# Patient Record
Sex: MALE | Race: WHITE | Employment: UNEMPLOYED | URBAN - METROPOLITAN AREA
[De-identification: names, ages, dates, MRNs, and addresses within clinical notes are randomized per-mention and may not be internally consistent; named-entity substitution may affect disease eponyms.]

---

## 2019-01-25 ENCOUNTER — OFFICE VISIT (OUTPATIENT)
Dept: ORTHOPEDIC SURGERY | Age: 24
End: 2019-01-25
Payer: COMMERCIAL

## 2019-01-25 VITALS — HEIGHT: 65 IN | WEIGHT: 201.5 LBS | BODY MASS INDEX: 33.57 KG/M2 | RESPIRATION RATE: 17 BRPM

## 2019-01-25 DIAGNOSIS — M79.601 RIGHT ARM PAIN: ICD-10-CM

## 2019-01-25 DIAGNOSIS — G54.0: ICD-10-CM

## 2019-01-25 DIAGNOSIS — M79.641 PAIN OF RIGHT HAND: Primary | ICD-10-CM

## 2019-01-25 PROCEDURE — G8427 DOCREV CUR MEDS BY ELIG CLIN: HCPCS | Performed by: ORTHOPAEDIC SURGERY

## 2019-01-25 PROCEDURE — 99243 OFF/OP CNSLTJ NEW/EST LOW 30: CPT | Performed by: ORTHOPAEDIC SURGERY

## 2019-01-25 PROCEDURE — G8417 CALC BMI ABV UP PARAM F/U: HCPCS | Performed by: ORTHOPAEDIC SURGERY

## 2019-01-25 PROCEDURE — G8484 FLU IMMUNIZE NO ADMIN: HCPCS | Performed by: ORTHOPAEDIC SURGERY

## 2021-10-10 PROCEDURE — 99282 EMERGENCY DEPT VISIT SF MDM: CPT

## 2021-10-11 ENCOUNTER — HOSPITAL ENCOUNTER (EMERGENCY)
Age: 26
Discharge: HOME | End: 2021-10-11
Attending: STUDENT IN AN ORGANIZED HEALTH CARE EDUCATION/TRAINING PROGRAM
Payer: COMMERCIAL

## 2021-10-11 VITALS
HEART RATE: 93 BPM | HEIGHT: 65 IN | TEMPERATURE: 97.9 F | RESPIRATION RATE: 14 BRPM | DIASTOLIC BLOOD PRESSURE: 93 MMHG | BODY MASS INDEX: 39.99 KG/M2 | WEIGHT: 240 LBS | SYSTOLIC BLOOD PRESSURE: 156 MMHG | OXYGEN SATURATION: 99 %

## 2021-10-11 DIAGNOSIS — K08.89 PAIN, DENTAL: Primary | ICD-10-CM

## 2021-10-11 DIAGNOSIS — J02.9 ACUTE PHARYNGITIS, UNSPECIFIED ETIOLOGY: ICD-10-CM

## 2021-10-11 LAB — S PYO AG THROAT QL: NEGATIVE

## 2021-10-11 PROCEDURE — 87880 STREP A ASSAY W/OPTIC: CPT

## 2021-10-11 PROCEDURE — 87081 CULTURE SCREEN ONLY: CPT

## 2021-10-11 PROCEDURE — 6370000000 HC RX 637 (ALT 250 FOR IP): Performed by: STUDENT IN AN ORGANIZED HEALTH CARE EDUCATION/TRAINING PROGRAM

## 2021-10-11 RX ORDER — NAPROXEN 500 MG/1
500 TABLET ORAL 2 TIMES DAILY PRN
Qty: 20 TABLET | Refills: 0 | Status: SHIPPED | OUTPATIENT
Start: 2021-10-11 | End: 2022-05-24 | Stop reason: ALTCHOICE

## 2021-10-11 RX ORDER — AMOXICILLIN AND CLAVULANATE POTASSIUM 875; 125 MG/1; MG/1
1 TABLET, FILM COATED ORAL ONCE
Status: COMPLETED | OUTPATIENT
Start: 2021-10-11 | End: 2021-10-11

## 2021-10-11 RX ORDER — NAPROXEN 500 MG/1
500 TABLET ORAL ONCE
Status: COMPLETED | OUTPATIENT
Start: 2021-10-11 | End: 2021-10-11

## 2021-10-11 RX ORDER — AMOXICILLIN AND CLAVULANATE POTASSIUM 875; 125 MG/1; MG/1
1 TABLET, FILM COATED ORAL 2 TIMES DAILY
Qty: 14 TABLET | Refills: 0 | Status: SHIPPED | OUTPATIENT
Start: 2021-10-11 | End: 2021-10-18

## 2021-10-11 RX ADMIN — AMOXICILLIN AND CLAVULANATE POTASSIUM 1 TABLET: 875; 125 TABLET, FILM COATED ORAL at 01:40

## 2021-10-11 RX ADMIN — NAPROXEN 500 MG: 500 TABLET ORAL at 01:38

## 2021-10-11 ASSESSMENT — PAIN DESCRIPTION - PAIN TYPE: TYPE: ACUTE PAIN

## 2021-10-11 ASSESSMENT — PAIN SCALES - GENERAL
PAINLEVEL_OUTOF10: 8
PAINLEVEL_OUTOF10: 8

## 2021-10-11 ASSESSMENT — PAIN DESCRIPTION - ONSET: ONSET: GRADUAL

## 2021-10-11 ASSESSMENT — PAIN DESCRIPTION - DESCRIPTORS: DESCRIPTORS: ACHING;SHARP

## 2021-10-11 ASSESSMENT — PAIN DESCRIPTION - LOCATION: LOCATION: TEETH

## 2021-10-11 ASSESSMENT — PAIN DESCRIPTION - FREQUENCY: FREQUENCY: CONTINUOUS

## 2021-10-11 NOTE — ED PROVIDER NOTES
education level: Not on file   Occupational History    Not on file   Tobacco Use    Smoking status: Current Some Day Smoker     Packs/day: 0.50     Years: 5.00     Pack years: 2.50     Types: Cigarettes    Smokeless tobacco: Never Used   Substance and Sexual Activity    Alcohol use: No    Drug use: Yes     Types: Marijuana    Sexual activity: Not Currently   Other Topics Concern    Not on file   Social History Narrative    Not on file     Social Determinants of Health     Financial Resource Strain:     Difficulty of Paying Living Expenses:    Food Insecurity:     Worried About Running Out of Food in the Last Year:     Ran Out of Food in the Last Year:    Transportation Needs:     Lack of Transportation (Medical):  Lack of Transportation (Non-Medical):    Physical Activity:     Days of Exercise per Week:     Minutes of Exercise per Session:    Stress:     Feeling of Stress :    Social Connections:     Frequency of Communication with Friends and Family:     Frequency of Social Gatherings with Friends and Family:     Attends Mormon Services:     Active Member of Clubs or Organizations:     Attends Club or Organization Meetings:     Marital Status:    Intimate Partner Violence:     Fear of Current or Ex-Partner:     Emotionally Abused:     Physically Abused:     Sexually Abused:      No current facility-administered medications for this encounter. Current Outpatient Medications   Medication Sig Dispense Refill    amoxicillin-clavulanate (AUGMENTIN) 875-125 MG per tablet Take 1 tablet by mouth 2 times daily for 7 days 14 tablet 0    naproxen (NAPROSYN) 500 MG tablet Take 1 tablet by mouth 2 times daily as needed for Pain 20 tablet 0     No Known Allergies    REVIEW OF SYSTEMS  All systems reviewed, pertinent positives per HPI otherwise noted to be negative.     PHYSICAL EXAM  BP (!) 156/93   Pulse 93   Temp 97.9 °F (36.6 °C) (Tympanic)   Resp 14   Ht 5' 5\" (1.651 m)   Wt 240 lb (108.9 kg)   SpO2 99%   BMI 39.94 kg/m²    GENERAL APPEARANCE: Awake and alert. Cooperative. no distress. HENT: Normocephalic. Atraumatic. Mucous membranes are moist. Facial edema and erythema are absent. MOUTH & OROPHARYNX: Patient reports bilateral wisdom teeth are tender; dental caries are not present. Lingual edema, erythema, and cyanosis are absent. Facial droop and trismus are absent. Salivary gland tenderness, edema, and abscesses are absent. Tongue is in midline without edema, erythema, exudates, or protrusion. The floor of the mouth is not tender; palpable fluid collections are absent. Gingival abscesses, edema, erythema, and exudates are absent. Pharyngeal, tonsillar, and uvular erythema, edema, and exudates are absent. Peritonsillar and retropharyngeal abscess and edema are absent. NECK: Supple. Full range of motion of the neck without stiffness or pain. Meningismus and brawny edema are absent. No meningismus. No stridor. No lymphadenopathy  EYES: PERRL. EOM's grossly intact. HEART/CHEST: RRR. No murmurs. Chest wall is not tender to palpation. LUNGS: Respirations unlabored. CTAB. Good air exchange. Speaking comfortably in full sentences. ABDOMEN: No tenderness. Soft. Non-distended. No masses. No organomegaly. No guarding or rebound. MUSCULOSKELETAL: No extremity edema. Compartments soft. No deformity. No tenderness in the extremities. All extremities neurovascularly intact. SKIN: Warm and dry. No acute rashes. NEUROLOGICAL: Alert and oriented. CN's 2-12 intact. No gross facial drooping. Strength 5/5, sensation intact. PSYCHIATRIC: Normal mood and affect. LABS  I have reviewed all labs for this visit.    Results for orders placed or performed during the hospital encounter of 10/11/21   Strep screen group a throat    Specimen: Throat   Result Value Ref Range    Rapid Strep A Screen Negative Negative       RADIOLOGY    No orders to display          ED COURSE / [facility]  Patient seen and evaluated. Old records reviewed and pertinent information included in HPI. Labs and imaging reviewed and results discussed with patient. Overall well appearing patient, in no acute distress, presenting for chronic dental pain and 3 days of sore throat. Physical exam remarkable for no stridor, no lymphadenopathy, no evidence of intramouth infection. Differential diagnosis includes but is not limited to: dental caries, pulpitis, dental abscess, peridontal infection, low suspicion for retropharyngeal abscess, peritonsillar abscess, Cindi angina      During the patient's ED course, the patient was given:  Medications   naproxen (NAPROSYN) tablet 500 mg (500 mg Oral Given 10/11/21 0138)   amoxicillin-clavulanate (AUGMENTIN) 875-125 MG per tablet 1 tablet (1 tablet Oral Given 10/11/21 0140)        Julianne Green denies dysphagia, dyspnea, neck stiffness, and odynophagia. Further, there's no brawny edema, uvular deviation, menigismus, stridor, trismus, or drooling. I don't see evidence for a deep space neck infection, such as Cindi's Angina. Strep swab negative. Exam overall reassuring. Evaluation overall reassuring. At this time, feel the patient is appropriate for discharge to follow-up with a primary care doctor. Patient feels comfortable with discharge at this time. Patient was provided with prescriptions for Augmentin and naproxen. Return precautions given. Encouraged PCP follow-up in 2 days. Patient given information to follow-up with dentistry. Patient discharged in stable condition. I estimate there is LOW risk for a DEEP SPACE INFECTION (e.g., CINDIS ANGINA OR RETROPHARYNGEAL ABSCESS), EPIGLOTTIS, MENINGITIS, or AIRWAY COMPROMISE, thus I consider the discharge disposition reasonable. Also, there is no evidence of sepsis or toxicity. Julianne Green and I have discussed the diagnosis and risks, and we agree with discharging home to follow-up with their primary doctor. We also discussed returning to the Emergency Department immediately if new or worsening symptoms occur. We have discussed the symptoms which are most concerning (e.g., changing or worsening pain, trouble swallowing or breathing, neck stiffness or fever) that necessitate immediate return. CLINICAL IMPRESSION  1. Pain, dental    2. Acute pharyngitis, unspecified etiology        Blood pressure (!) 156/93, pulse 93, temperature 97.9 °F (36.6 °C), temperature source Tympanic, resp. rate 14, height 5' 5\" (1.651 m), weight 240 lb (108.9 kg), SpO2 99 %. DISPOSITION  Rex Ferguson was discharged to home in stable condition. Patient was given scripts for the following medications. I counseled patient how to take these medications. Discharge Medication List as of 10/11/2021  1:43 AM      START taking these medications    Details   amoxicillin-clavulanate (AUGMENTIN) 875-125 MG per tablet Take 1 tablet by mouth 2 times daily for 7 days, Disp-14 tablet, R-0Normal      naproxen (NAPROSYN) 500 MG tablet Take 1 tablet by mouth 2 times daily as needed for Pain, Disp-20 tablet, R-0Normal             Follow-up with:  Mariya Rodriges MD  [address] [place] [address] Dr Amber [address]  [phone]    Schedule an appointment as soon as possible for a visit in 2 days      [facility] PHYSICAL [facility] ED  [address] [address]  [phone]  Go to   As needed, If symptoms worsen      DISCLAIMER: This chart was created using Dragon dictation software. Efforts were made by me to ensure accuracy, however some errors may be present due to limitations of this technology and occasionally words are not transcribed correctly.           Grecia Hart MD  10/11/21 9294

## 2021-10-13 LAB — S PYO THROAT QL CULT: NORMAL

## 2021-10-30 ENCOUNTER — HOSPITAL ENCOUNTER (EMERGENCY)
Age: 26
Discharge: HOME | End: 2021-10-30
Attending: EMERGENCY MEDICINE
Payer: COMMERCIAL

## 2021-10-30 ENCOUNTER — APPOINTMENT (OUTPATIENT)
Dept: GENERAL RADIOLOGY | Age: 26
End: 2021-10-30
Payer: COMMERCIAL

## 2021-10-30 VITALS
SYSTOLIC BLOOD PRESSURE: 146 MMHG | BODY MASS INDEX: 39.99 KG/M2 | HEIGHT: 65 IN | DIASTOLIC BLOOD PRESSURE: 86 MMHG | WEIGHT: 240 LBS | HEART RATE: 73 BPM | RESPIRATION RATE: 14 BRPM | OXYGEN SATURATION: 99 %

## 2021-10-30 DIAGNOSIS — R06.4 HYPERVENTILATION: Primary | ICD-10-CM

## 2021-10-30 DIAGNOSIS — F15.10 METHAMPHETAMINE USE (HCC): ICD-10-CM

## 2021-10-30 LAB
A/G RATIO: 1.2 (ref 1.1–2.2)
ALBUMIN SERPL-MCNC: 4.1 G/DL (ref 3.4–5)
ALP BLD-CCNC: 72 U/L (ref 40–129)
ALT SERPL-CCNC: 41 U/L (ref 10–40)
AMPHETAMINE SCREEN, URINE: POSITIVE
ANION GAP SERPL CALCULATED.3IONS-SCNC: 13 MMOL/L (ref 3–16)
AST SERPL-CCNC: 30 U/L (ref 15–37)
BARBITURATE SCREEN URINE: ABNORMAL
BASOPHILS ABSOLUTE: 0 K/UL (ref 0–0.2)
BASOPHILS RELATIVE PERCENT: 0 %
BENZODIAZEPINE SCREEN, URINE: ABNORMAL
BILIRUB SERPL-MCNC: 0.8 MG/DL (ref 0–1)
BUN BLDV-MCNC: 13 MG/DL (ref 7–20)
CALCIUM SERPL-MCNC: 8.9 MG/DL (ref 8.3–10.6)
CANNABINOID SCREEN URINE: POSITIVE
CHLORIDE BLD-SCNC: 106 MMOL/L (ref 99–110)
CO2: 19 MMOL/L (ref 21–32)
COCAINE METABOLITE SCREEN URINE: ABNORMAL
CREAT SERPL-MCNC: 0.7 MG/DL (ref 0.9–1.3)
D DIMER: <200 NG/ML DDU (ref 0–229)
EOSINOPHILS ABSOLUTE: 0.4 K/UL (ref 0–0.6)
EOSINOPHILS RELATIVE PERCENT: 4 %
ETHANOL: NORMAL MG/DL (ref 0–0.08)
GFR AFRICAN AMERICAN: >60
GFR NON-AFRICAN AMERICAN: >60
GLUCOSE BLD-MCNC: 102 MG/DL (ref 70–99)
HCT VFR BLD CALC: 50.3 % (ref 40.5–52.5)
HEMOGLOBIN: 17.5 G/DL (ref 13.5–17.5)
LYMPHOCYTES ABSOLUTE: 3.4 K/UL (ref 1–5.1)
LYMPHOCYTES RELATIVE PERCENT: 35 %
Lab: ABNORMAL
MAGNESIUM: 1.9 MG/DL (ref 1.8–2.4)
MCH RBC QN AUTO: 31 PG (ref 26–34)
MCHC RBC AUTO-ENTMCNC: 34.7 G/DL (ref 31–36)
MCV RBC AUTO: 89.3 FL (ref 80–100)
METHADONE SCREEN, URINE: ABNORMAL
MONOCYTES ABSOLUTE: 1 K/UL (ref 0–1.3)
MONOCYTES RELATIVE PERCENT: 10 %
NEUTROPHILS ABSOLUTE: 4.9 K/UL (ref 1.7–7.7)
NEUTROPHILS RELATIVE PERCENT: 51 %
OPIATE SCREEN URINE: ABNORMAL
OXYCODONE URINE: ABNORMAL
PDW BLD-RTO: 14.5 % (ref 12.4–15.4)
PH UA: 5
PHENCYCLIDINE SCREEN URINE: ABNORMAL
PLATELET # BLD: 387 K/UL (ref 135–450)
PMV BLD AUTO: 9.5 FL (ref 5–10.5)
POTASSIUM REFLEX MAGNESIUM: 3.5 MMOL/L (ref 3.5–5.1)
PRO-BNP: 23 PG/ML (ref 0–124)
PROPOXYPHENE SCREEN: ABNORMAL
RBC # BLD: 5.64 M/UL (ref 4.2–5.9)
RBC # BLD: NORMAL 10*6/UL
SARS-COV-2, NAAT: NOT DETECTED
SLIDE REVIEW: NORMAL
SODIUM BLD-SCNC: 138 MMOL/L (ref 136–145)
TOTAL PROTEIN: 7.5 G/DL (ref 6.4–8.2)
TROPONIN: <0.01 NG/ML
WBC # BLD: 9.6 K/UL (ref 4–11)

## 2021-10-30 PROCEDURE — 80053 COMPREHEN METABOLIC PANEL: CPT

## 2021-10-30 PROCEDURE — 83735 ASSAY OF MAGNESIUM: CPT

## 2021-10-30 PROCEDURE — 82077 ASSAY SPEC XCP UR&BREATH IA: CPT

## 2021-10-30 PROCEDURE — 84484 ASSAY OF TROPONIN QUANT: CPT

## 2021-10-30 PROCEDURE — 83880 ASSAY OF NATRIURETIC PEPTIDE: CPT

## 2021-10-30 PROCEDURE — 2580000003 HC RX 258: Performed by: PHYSICIAN ASSISTANT

## 2021-10-30 PROCEDURE — 99285 EMERGENCY DEPT VISIT HI MDM: CPT

## 2021-10-30 PROCEDURE — 87635 SARS-COV-2 COVID-19 AMP PRB: CPT

## 2021-10-30 PROCEDURE — 85025 COMPLETE CBC W/AUTO DIFF WBC: CPT

## 2021-10-30 PROCEDURE — 6370000000 HC RX 637 (ALT 250 FOR IP): Performed by: PHYSICIAN ASSISTANT

## 2021-10-30 PROCEDURE — 85379 FIBRIN DEGRADATION QUANT: CPT

## 2021-10-30 PROCEDURE — 36415 COLL VENOUS BLD VENIPUNCTURE: CPT

## 2021-10-30 PROCEDURE — 93005 ELECTROCARDIOGRAM TRACING: CPT | Performed by: PHYSICIAN ASSISTANT

## 2021-10-30 PROCEDURE — 80307 DRUG TEST PRSMV CHEM ANLYZR: CPT

## 2021-10-30 PROCEDURE — 71045 X-RAY EXAM CHEST 1 VIEW: CPT

## 2021-10-30 RX ORDER — HYDROXYZINE PAMOATE 25 MG/1
25 CAPSULE ORAL ONCE
Status: COMPLETED | OUTPATIENT
Start: 2021-10-30 | End: 2021-10-30

## 2021-10-30 RX ORDER — 0.9 % SODIUM CHLORIDE 0.9 %
1000 INTRAVENOUS SOLUTION INTRAVENOUS ONCE
Status: COMPLETED | OUTPATIENT
Start: 2021-10-30 | End: 2021-10-30

## 2021-10-30 RX ADMIN — SODIUM CHLORIDE 1000 ML: 9 INJECTION, SOLUTION INTRAVENOUS at 17:23

## 2021-10-30 RX ADMIN — HYDROXYZINE PAMOATE 25 MG: 25 CAPSULE ORAL at 17:16

## 2021-10-30 ASSESSMENT — ENCOUNTER SYMPTOMS
ABDOMINAL PAIN: 0
VOMITING: 0
SHORTNESS OF BREATH: 1
COUGH: 1

## 2021-10-30 NOTE — ED TRIAGE NOTES
Chief Complaint   Patient presents with    Shortness of Breath     Pt states that he started with SOB a couple of days ago. Pt reports that he did some meth yesterday and now feels even worse.

## 2021-10-30 NOTE — ED PROVIDER NOTES
I did not personally evaluate this patient but I was asked to review the EKG. EKG  The Ekg interpreted by myself in the emergency department in the absence of a cardiologist.  normal sinus rhythm with a rate of 95, incomplete right bundle branch block  Axis is   Normal  QTc is  within an acceptable range  Intervals and Durations are unremarkable. No specific ST-T wave changes appreciated. No evidence of acute ischemia.    No prior EKG available for comparison     Michael Bah MD  10/30/21 1985

## 2021-10-30 NOTE — ED PROVIDER NOTES
[address] ED  EMERGENCY DEPARTMENT ENCOUNTER        Pt Name: Miguel Hong  MRN: [id number]  [place] 1995  Date of evaluation: 10/30/2021  Provider: Sri Jones PA-C  PCP: Bev Levy MD    Shared Visit or Autonomous Visit: NIKO. I have evaluated this patient. My supervising physician was available for consultation. CHIEF COMPLAINT       Chief Complaint   Patient presents with    Shortness of Breath     Pt states that he started with SOB a couple of days ago. Pt reports that he did some meth yesterday and now feels even worse. HISTORY OF PRESENT ILLNESS   (Location/Symptom, Timing/Onset, Context/Setting, Quality, Duration, Modifying Factors, Severity)  Note limiting factors. Miguel Hong is a 23 y.o. male presenting to the emergency department for evaluation of shortness of breath. States he has had a cough for couple of days. States him and his brother gave a couple of guys a ride and then later found out that her whole family was sick with bronchitis states he went out last night drinking and he also smoked meth for the first time and now feeling worse states having difficulty breathing and his arms and legs feel tingly. No chest pain. No fever. No leg swelling. No recent surgery or immobilizations. No history of DVT or PE. No known heart problems. The history is provided by the patient. Shortness of Breath  Onset quality:  Gradual  Duration:  2 days  Progression:  Worsening  Chronicity:  New  Associated symptoms: cough    Associated symptoms: no abdominal pain, no chest pain, no fever, no syncope and no vomiting    Risk factors: no hx of PE/DVT and no recent surgery        Nursing Notes were reviewed    REVIEW OF SYSTEMS    (2-9 systems for level 4, 10 or more for level 5)     Review of Systems   Constitutional: Positive for fatigue. Negative for fever. Respiratory: Positive for cough and shortness of breath.     Cardiovascular: Negative for chest pain, leg swelling and syncope. Gastrointestinal: Negative for abdominal pain and vomiting. Neurological:        Tingling all over   All other systems reviewed and are negative. Positives and Pertinent negatives as per HPI. PAST MEDICAL HISTORY     Past Medical History:   Diagnosis Date    ADHD (attention deficit hyperactivity disorder)     Allergy     Brain lesion     Depression     Foreign body (FB) in soft tissue     MRSA (methicillin resistant staph aureus) culture positive     Outbursts of anger     Overweight          SURGICAL HISTORY     Past Surgical History:   Procedure Laterality Date    OTHER SURGICAL HISTORY Left 4/15/15    exploration of buttock    OTHER SURGICAL HISTORY Left 10/06/2016    exploration buttock         CURRENTMEDICATIONS       Previous Medications    NAPROXEN (NAPROSYN) 500 MG TABLET    Take 1 tablet by mouth 2 times daily as needed for Pain         ALLERGIES     Patient has no known allergies.     FAMILYHISTORY       Family History   Problem Relation Age of Onset    High Blood Pressure Mother     Diabetes Mother     Other Father           SOCIAL HISTORY       Social History     Socioeconomic History    Marital status: Single     Spouse name: Not on file    Number of children: Not on file    Years of education: Not on file    Highest education level: Not on file   Occupational History    Not on file   Tobacco Use    Smoking status: Current Some Day Smoker     Packs/day: 0.50     Years: 5.00     Pack years: 2.50     Types: Cigarettes    Smokeless tobacco: Never Used   Substance and Sexual Activity    Alcohol use: No    Drug use: Yes     Types: Marijuana    Sexual activity: Not Currently   Other Topics Concern    Not on file   Social History Narrative    Not on file     Social Determinants of Health     Financial Resource Strain:     Difficulty of Paying Living Expenses:    Food Insecurity:     Worried About Running Out of Food in the Last Year:     Quin of Food in the Last Year:    Transportation Needs:     Lack of Transportation (Medical):  Lack of Transportation (Non-Medical):    Physical Activity:     Days of Exercise per Week:     Minutes of Exercise per Session:    Stress:     Feeling of Stress :    Social Connections:     Frequency of Communication with Friends and Family:     Frequency of Social Gatherings with Friends and Family:     Attends Alevism Services:     Active Member of Clubs or Organizations:     Attends Club or Organization Meetings:     Marital Status:    Intimate Partner Violence:     Fear of Current or Ex-Partner:     Emotionally Abused:     Physically Abused:     Sexually Abused:        SCREENINGS             PHYSICAL EXAM    (up to 7 for level 4, 8 or more for level 5)     ED Triage Vitals [10/30/21 1633]   BP Temp Temp src Pulse Resp SpO2 Height Weight   (!) 166/105 -- -- 131 26 100 % 5' 5\" (1.651 m) 240 lb (108.9 kg)       Physical Exam  Vitals and nursing note reviewed. Constitutional:       Appearance: He is well-developed. He is not toxic-appearing. HENT:      Head: Normocephalic and atraumatic. Mouth/Throat:      Mouth: Mucous membranes are moist.      Pharynx: Oropharynx is clear. No pharyngeal swelling or posterior oropharyngeal erythema. Eyes:      Conjunctiva/sclera: Conjunctivae normal.      Pupils: Pupils are equal, round, and reactive to light. Cardiovascular:      Rate and Rhythm: Regular rhythm. Tachycardia present. Pulses:           Radial pulses are 2+ on the right side and 2+ on the left side. Posterior tibial pulses are 2+ on the right side and 2+ on the left side. Heart sounds: Normal heart sounds. Pulmonary:      Effort: No respiratory distress. Breath sounds: Normal breath sounds. No stridor. No wheezing, rhonchi or rales. Comments: Hyperventilating  Abdominal:      General: Bowel sounds are normal.      Palpations: Abdomen is soft. Abdomen is not rigid. Tenderness: There is no abdominal tenderness. There is no guarding or rebound. Musculoskeletal:         General: Normal range of motion. Cervical back: Normal range of motion and neck supple. Right lower leg: No edema. Left lower leg: No edema. Comments: No calf tenderness or leg swelling   Skin:     General: Skin is warm and dry. Neurological:      Mental Status: He is alert and oriented to person, place, and time. GCS: GCS eye subscore is 4. GCS verbal subscore is 5. GCS motor subscore is 6. Cranial Nerves: No cranial nerve deficit. Sensory: Sensation is intact. No sensory deficit. Motor: Motor function is intact. No abnormal muscle tone. Coordination: Coordination normal.   Psychiatric:         Mood and Affect: Mood is anxious. Speech: Speech normal.         Behavior: Behavior is cooperative. Thought Content:  Thought content normal.         DIAGNOSTIC RESULTS   LABS:    Labs Reviewed   URINE DRUG SCREEN - Abnormal; Notable for the following components:       Result Value    Amphetamine Screen, Urine POSITIVE (*)     Cannabinoid Scrn, Ur POSITIVE (*)     All other components within normal limits    Narrative:     Performed at:  [place] [address],  [employer] [facility]   Phone [phone]   COMPREHENSIVE METABOLIC PANEL W/ REFLEX TO MG FOR LOW K - Abnormal; Notable for the following components:    CO2 19 (*)     Glucose 102 (*)     CREATININE 0.7 (*)     ALT 41 (*)     All other components within normal limits    Narrative:     Performed at:  [facility][1] [address],  [employer], [facility]   Phone [phone], RAPID    Narrative:     Performed at:  [place] [address],  [employer], [facility]   Phone [phone]   CBC WITH AUTO DIFFERENTIAL    Narrative:     Performed at:  [facility] Urine POSITIVE (A) Negative <1000ng/mL    Barbiturate Screen, Ur Neg Negative <200 ng/mL    Benzodiazepine Screen, Urine Neg Negative <200 ng/mL    Cannabinoid Scrn, Ur POSITIVE (A) Negative <50 ng/mL    Cocaine Metabolite Screen, Urine Neg Negative <300 ng/mL    Opiate Scrn, Ur Neg Negative <300 ng/mL    PCP Screen, Urine Neg Negative <25 ng/mL    Methadone Screen, Urine Neg Negative <300 ng/mL    Propoxyphene Scrn, Ur Neg Negative <300 ng/mL    Oxycodone Urine Neg Negative <100 ng/ml    pH, UA 5.0     Drug Screen Comment: see below    Comprehensive Metabolic Panel w/ Reflex to MG   Result Value Ref Range    Sodium 138 136 - 145 mmol/L    Potassium reflex Magnesium 3.5 3.5 - 5.1 mmol/L    Chloride 106 99 - 110 mmol/L    CO2 19 (L) 21 - 32 mmol/L    Anion Gap 13 3 - 16    Glucose 102 (H) 70 - 99 mg/dL    BUN 13 7 - 20 mg/dL    CREATININE 0.7 (L) 0.9 - 1.3 mg/dL    GFR Non-African American >60 >60    GFR African American >60 >60    Calcium 8.9 8.3 - 10.6 mg/dL    Total Protein 7.5 6.4 - 8.2 g/dL    Albumin 4.1 3.4 - 5.0 g/dL    Albumin/Globulin Ratio 1.2 1.1 - 2.2    Total Bilirubin 0.8 0.0 - 1.0 mg/dL    Alkaline Phosphatase 72 40 - 129 U/L    ALT 41 (H) 10 - 40 U/L    AST 30 15 - 37 U/L   Troponin   Result Value Ref Range    Troponin <0.01 <0.01 ng/mL   Brain Natriuretic Peptide   Result Value Ref Range    Pro-BNP 23 0 - 124 pg/mL   Ethanol   Result Value Ref Range    Ethanol Lvl None Detected mg/dL   Magnesium   Result Value Ref Range    Magnesium 1.90 1.80 - 2.40 mg/dL   EKG 12 Lead   Result Value Ref Range    Ventricular Rate 95 BPM    Atrial Rate 95 BPM    P-R Interval 146 ms    QRS Duration 100 ms    Q-T Interval 360 ms    QTc Calculation (Bazett) 452 ms    P Axis 54 degrees    R Axis 80 degrees    T Axis 67 degrees    Diagnosis       Normal sinus rhythmNormal ECGNo previous ECGs available       All other labs were within normal range or not returned as of this dictation. EKG:  All EKG's are interpreted by the Emergency Department Physician in the absence of a cardiologist.  Please see their note for interpretation of EKG. RADIOLOGY:   Non-plain film images such as CT, Ultrasound and MRI are read by the radiologist. Plain radiographic images are visualized andpreliminarily interpreted by the  ED Provider with the below findings:        Interpretation perthe Radiologist below, if available at the time of this note:    XR CHEST PORTABLE   Final Result   No acute process. XR CHEST PORTABLE    Result Date: 10/30/2021  EXAMINATION: ONE XRAY VIEW OF THE CHEST 10/30/2021 4:15 pm COMPARISON: None. HISTORY: ORDERING SYSTEM PROVIDED HISTORY: shortness of breath TECHNOLOGIST PROVIDED HISTORY: Reason for exam:->shortness of breath Reason for Exam: Shortness of Breath (Pt states that he started with SOB a couple of days ago. Pt reports that he did some meth yesterday and now feels even worse. ) Acuity: Unknown Type of Exam: Unknown FINDINGS: The lungs are without acute focal process. There is no effusion or pneumothorax. The cardiomediastinal silhouette is without acute process. The osseous structures are without acute process. No acute process. PROCEDURES   Unless otherwise noted below, none     Procedures    CRITICAL CARE TIME   N/A    CONSULTS:  None      EMERGENCY DEPARTMENT COURSE and DIFFERENTIAL DIAGNOSIS/MDM:   Vitals:    Vitals:    10/30/21 1732 10/30/21 1801 10/30/21 1831 10/30/21 1901   BP: 132/86 120/80 129/79 139/81   Pulse: 85 81 81 81   Resp: 13 22 18 18   SpO2: 99% 99% 100% 100%   Weight:       Height:           Patient was given thefollowing medications:  Medications   0.9 % sodium chloride bolus (0 mLs IntraVENous Stopped 10/30/21 1847)   hydrOXYzine (VISTARIL) capsule 25 mg (25 mg Oral Given 10/30/21 1716)     PULMONARY EMBOLISM RULE-OUT CRITERIA:    1. Age > 52? No  2. Pulse greater than 99/min? Yes  3. Room air pulse ox <95%? No  4. Hemoptysis? No  5. On estrogen? No  6.  Prior diagnosis of DVT or PE? No  7. Surgery or trauma requiring endotracheal intubation or hospitalization in past 4 wks? No  8. Unilateral leg swelling? No      HEART SCORE:        0-3 Adverse outcome risk: 2.5% (very low to low risk) Supports early discharge with appropriate follow-up   4-6 Adverse outcome risk: 20.3% (moderate risk) Supports admission with standard rule-out management and stress testing   7-10 Adverse outcome risk: 72.7% (high to very high risk) Risk in first 30 days >50% Supports early aggressive management and typically with cardiac catheterization       Heart score: 1 obese, smoker. This falls under the following category: Score of 0-3, which indicates a very low risk for major adverse cardiac event and supports early discharge        ED course  Patient presented to the ER for evaluation of feeling short of breath. He was hyperventilating when he initially came in discussed with him slowing his breathing, discussed his vitals are reassuring oxygen saturation 100% on room air, lung sounds are clear, was initially tachycardic and appeared anxious he was given hydroxyzine and given IV fluids and tachycardia has resolved. He denied any chest pain but we did get a cardiac work-up due to methamphetamine use and and feeling short of breath and work-up was unremarkable. EKG no acute ischemia. Troponin is normal. Do not suspect ACS. D-dimer within normal limits. Do not suspect PE. Chest x-ray no acute cardiopulmonary disease. No pneumonia. Covid test is negative. Urine drug screen positive for methamphetamine and marijuana. On reevaluation 7:46 PM EDT patient is calm he has overall improved appearance. Respirations are normal. Oxygen saturation remains at 100%. At this point I think it is safe for discharge home advise close follow-up with his doctor and returning for any worsening.       I estimate there is LOW risk for PULMONARY EMBOLISM, PULMONARY EDEMA, PNEUMONIA, PNEUMOTHORAX, STATUS ASTHMATICUS, ACUTE RESPIRATORY FAILURE, OR ACUTE CORONARY SYNDROME, thus I consider the discharge disposition reasonable. FINAL IMPRESSION      1. Hyperventilation    2.  Methamphetamine use [facility])          DISPOSITION/PLAN   DISPOSITION     PATIENT REFERREDTO:  Sindhu Ambrocio MD  [address] [place] [address] [address] Amber [address] [address]  [phone]    In 2 days      [facility] ED  [address] [facility]  [phone]    If symptoms worsen      DISCHARGE MEDICATIONS:  New Prescriptions    No medications on file       DISCONTINUED MEDICATIONS:  Discontinued Medications    No medications on file              (Please note that portions ofthis note were completed with a voice recognition program.  Efforts were made to edit the dictations but occasionally words are mis-transcribed.)    Miriam Bermeo PA-C (electronically signed)            Alvarez Moreland PA-C  10/30/21 1947

## 2021-10-31 LAB
EKG ATRIAL RATE: 95 BPM
EKG DIAGNOSIS: NORMAL
EKG P AXIS: 54 DEGREES
EKG P-R INTERVAL: 146 MS
EKG Q-T INTERVAL: 360 MS
EKG QRS DURATION: 100 MS
EKG QTC CALCULATION (BAZETT): 452 MS
EKG R AXIS: 80 DEGREES
EKG T AXIS: 67 DEGREES
EKG VENTRICULAR RATE: 95 BPM

## 2021-10-31 PROCEDURE — 93010 ELECTROCARDIOGRAM REPORT: CPT | Performed by: INTERNAL MEDICINE

## 2022-05-14 ENCOUNTER — APPOINTMENT (OUTPATIENT)
Dept: CT IMAGING | Age: 27
End: 2022-05-14
Payer: COMMERCIAL

## 2022-05-14 ENCOUNTER — APPOINTMENT (OUTPATIENT)
Dept: GENERAL RADIOLOGY | Age: 27
End: 2022-05-14
Payer: COMMERCIAL

## 2022-05-14 ENCOUNTER — HOSPITAL ENCOUNTER (EMERGENCY)
Age: 27
Discharge: HOME | End: 2022-05-14
Attending: STUDENT IN AN ORGANIZED HEALTH CARE EDUCATION/TRAINING PROGRAM
Payer: COMMERCIAL

## 2022-05-14 VITALS
HEIGHT: 65 IN | DIASTOLIC BLOOD PRESSURE: 78 MMHG | TEMPERATURE: 96 F | RESPIRATION RATE: 18 BRPM | HEART RATE: 86 BPM | OXYGEN SATURATION: 99 % | SYSTOLIC BLOOD PRESSURE: 128 MMHG | BODY MASS INDEX: 33.32 KG/M2 | WEIGHT: 200 LBS

## 2022-05-14 DIAGNOSIS — K59.00 CONSTIPATION, UNSPECIFIED CONSTIPATION TYPE: Primary | ICD-10-CM

## 2022-05-14 LAB
A/G RATIO: 1.6 (ref 1.1–2.2)
ALBUMIN SERPL-MCNC: 4.5 G/DL (ref 3.4–5)
ALP BLD-CCNC: 87 U/L (ref 40–129)
ALT SERPL-CCNC: 15 U/L (ref 10–40)
AMPHETAMINE SCREEN, URINE: ABNORMAL
ANION GAP SERPL CALCULATED.3IONS-SCNC: 13 MMOL/L (ref 3–16)
AST SERPL-CCNC: 12 U/L (ref 15–37)
BARBITURATE SCREEN URINE: ABNORMAL
BASOPHILS ABSOLUTE: 0.1 K/UL (ref 0–0.2)
BASOPHILS RELATIVE PERCENT: 0.8 %
BENZODIAZEPINE SCREEN, URINE: ABNORMAL
BILIRUB SERPL-MCNC: 0.5 MG/DL (ref 0–1)
BILIRUBIN URINE: ABNORMAL
BLOOD, URINE: ABNORMAL
BUN BLDV-MCNC: 5 MG/DL (ref 7–20)
CALCIUM SERPL-MCNC: 9.5 MG/DL (ref 8.3–10.6)
CANNABINOID SCREEN URINE: POSITIVE
CHLORIDE BLD-SCNC: 103 MMOL/L (ref 99–110)
CLARITY: CLEAR
CO2: 22 MMOL/L (ref 21–32)
COCAINE METABOLITE SCREEN URINE: ABNORMAL
COLOR: YELLOW
CREAT SERPL-MCNC: 0.7 MG/DL (ref 0.9–1.3)
EOSINOPHILS ABSOLUTE: 0.1 K/UL (ref 0–0.6)
EOSINOPHILS RELATIVE PERCENT: 0.6 %
GFR AFRICAN AMERICAN: >60
GFR NON-AFRICAN AMERICAN: >60
GLUCOSE BLD-MCNC: 100 MG/DL (ref 70–99)
GLUCOSE URINE: NEGATIVE MG/DL
HCT VFR BLD CALC: 44.1 % (ref 40.5–52.5)
HEMOGLOBIN: 15.1 G/DL (ref 13.5–17.5)
KETONES, URINE: 40 MG/DL
LEUKOCYTE ESTERASE, URINE: NEGATIVE
LYMPHOCYTES ABSOLUTE: 2.5 K/UL (ref 1–5.1)
LYMPHOCYTES RELATIVE PERCENT: 20.4 %
Lab: ABNORMAL
MCH RBC QN AUTO: 30.2 PG (ref 26–34)
MCHC RBC AUTO-ENTMCNC: 34.2 G/DL (ref 31–36)
MCV RBC AUTO: 88.2 FL (ref 80–100)
METHADONE SCREEN, URINE: ABNORMAL
MICROSCOPIC EXAMINATION: YES
MONOCYTES ABSOLUTE: 0.8 K/UL (ref 0–1.3)
MONOCYTES RELATIVE PERCENT: 6.4 %
MUCUS: ABNORMAL /LPF
NEUTROPHILS ABSOLUTE: 8.6 K/UL (ref 1.7–7.7)
NEUTROPHILS RELATIVE PERCENT: 71.8 %
NITRITE, URINE: NEGATIVE
OPIATE SCREEN URINE: ABNORMAL
OXYCODONE URINE: ABNORMAL
PDW BLD-RTO: 13.9 % (ref 12.4–15.4)
PH UA: 6.5
PH UA: 6.5 (ref 5–8)
PHENCYCLIDINE SCREEN URINE: ABNORMAL
PLATELET # BLD: 285 K/UL (ref 135–450)
PMV BLD AUTO: 8.1 FL (ref 5–10.5)
POTASSIUM REFLEX MAGNESIUM: 3.8 MMOL/L (ref 3.5–5.1)
PROPOXYPHENE SCREEN: ABNORMAL
PROTEIN UA: 100 MG/DL
RBC # BLD: 5 M/UL (ref 4.2–5.9)
RBC UA: ABNORMAL /HPF (ref 0–4)
SODIUM BLD-SCNC: 138 MMOL/L (ref 136–145)
SPECIFIC GRAVITY UA: >=1.03 (ref 1–1.03)
TOTAL PROTEIN: 7.4 G/DL (ref 6.4–8.2)
URINE TYPE: ABNORMAL
UROBILINOGEN, URINE: 1 E.U./DL
WBC # BLD: 12 K/UL (ref 4–11)
WBC UA: ABNORMAL /HPF (ref 0–5)

## 2022-05-14 PROCEDURE — 80053 COMPREHEN METABOLIC PANEL: CPT

## 2022-05-14 PROCEDURE — 6370000000 HC RX 637 (ALT 250 FOR IP): Performed by: STUDENT IN AN ORGANIZED HEALTH CARE EDUCATION/TRAINING PROGRAM

## 2022-05-14 PROCEDURE — 74018 RADEX ABDOMEN 1 VIEW: CPT

## 2022-05-14 PROCEDURE — 80307 DRUG TEST PRSMV CHEM ANLYZR: CPT

## 2022-05-14 PROCEDURE — 85025 COMPLETE CBC W/AUTO DIFF WBC: CPT

## 2022-05-14 PROCEDURE — 81001 URINALYSIS AUTO W/SCOPE: CPT

## 2022-05-14 PROCEDURE — 70450 CT HEAD/BRAIN W/O DYE: CPT

## 2022-05-14 PROCEDURE — 71045 X-RAY EXAM CHEST 1 VIEW: CPT

## 2022-05-14 PROCEDURE — 99284 EMERGENCY DEPT VISIT MOD MDM: CPT

## 2022-05-14 RX ORDER — ONDANSETRON 4 MG/1
4 TABLET, ORALLY DISINTEGRATING ORAL ONCE
Status: COMPLETED | OUTPATIENT
Start: 2022-05-14 | End: 2022-05-14

## 2022-05-14 RX ORDER — HYDROXYZINE HYDROCHLORIDE 25 MG/1
25 TABLET, FILM COATED ORAL ONCE
Status: COMPLETED | OUTPATIENT
Start: 2022-05-14 | End: 2022-05-14

## 2022-05-14 RX ORDER — POLYETHYLENE GLYCOL 3350 17 G/17G
17 POWDER, FOR SOLUTION ORAL DAILY PRN
Qty: 510 G | Refills: 0 | Status: SHIPPED | OUTPATIENT
Start: 2022-05-14 | End: 2022-06-13

## 2022-05-14 RX ADMIN — ONDANSETRON 4 MG: 4 TABLET, ORALLY DISINTEGRATING ORAL at 10:24

## 2022-05-14 RX ADMIN — HYDROXYZINE HYDROCHLORIDE 25 MG: 25 TABLET ORAL at 10:24

## 2022-05-14 ASSESSMENT — PAIN - FUNCTIONAL ASSESSMENT: PAIN_FUNCTIONAL_ASSESSMENT: NONE - DENIES PAIN

## 2022-05-14 NOTE — ED NOTES
Discharge instructions reviewed, patient verbalizes understanding. Denies questions/concerns at this time. Patient ambulatory out of ED in stable condition with all belongings.        Rachel Graham RN  05/14/22 2814

## 2022-05-14 NOTE — ED PROVIDER NOTES
[address] ED      CHIEF COMPLAINT  Shortness of Breath (Patient states he has been feeling SOB for the past few months, \"since I was here last\", but it's been getting worse the past few weeks. ), Tremors (Patient says he has tremors and tingling in his arms and legs. ), and Constipation (Patient says he has not had a BM in a few days. Says he can't eat or drink. )     HISTORY OF PRESENT ILLNESS  Netta Frank is a 23 y.o. male  who presents to the ED complaining of multiple complaints. Patient states that he has been feeling short of breath \"since last time I was here\" also states that he has been having tingling in his arms and legs since then. He also states that he has been constipated, has not had a bowel movement that was normal in the past few days but has had passage of small amounts of hard stool. He denies any abdominal pain. States that he is having decreased appetite secondary to his constipation. He denies any focal weaknesses. Denies chest pain. Denies other complaints or concerns. Denies drug use. No other complaints, modifying factors or associated symptoms. I have reviewed the following from the nursing documentation.     Past Medical History:   Diagnosis Date    ADHD (attention deficit hyperactivity disorder)     Allergy     Brain lesion     Depression     Foreign body (FB) in soft tissue     MRSA (methicillin resistant staph aureus) culture positive     Outbursts of anger     Overweight      Past Surgical History:   Procedure Laterality Date    OTHER SURGICAL HISTORY Left 4/15/15    exploration of buttock    OTHER SURGICAL HISTORY Left 10/06/2016    exploration buttock     Family History   Problem Relation Age of Onset    High Blood Pressure Mother     Diabetes Mother     Other Father      Social History     Socioeconomic History    Marital status: Single     Spouse name: Not on file    Number of children: Not on file    Years of education: Not on file   Babb Highest education level: Not on file   Occupational History    Not on file   Tobacco Use    Smoking status: Current Every Day Smoker     Packs/day: 0.50     Years: 5.00     Pack years: 2.50     Types: Cigarettes    Smokeless tobacco: Never Used   Substance and Sexual Activity    Alcohol use: No    Drug use: Not Currently     Types: Marijuana [place])    Sexual activity: Not Currently   Other Topics Concern    Not on file   Social History Narrative    Not on file     Social Determinants of Health     Financial Resource Strain:     Difficulty of Paying Living Expenses: Not on file   Food Insecurity:     Worried About Running Out of Food in the Last Year: Not on file    Quin of Food in the Last Year: Not on file   Transportation Needs:     Lack of Transportation (Medical): Not on file    Lack of Transportation (Non-Medical): Not on file   Physical Activity:     Days of Exercise per Week: Not on file    Minutes of Exercise per Session: Not on file   Stress:     Feeling of Stress : Not on file   Social Connections:     Frequency of Communication with Friends and Family: Not on file    Frequency of Social Gatherings with Friends and Family: Not on file    Attends [employer] Services: Not on file    Active Member of [address] [place] [address] [employer] or Organizations: Not on file    Attends Club or Organization Meetings: Not on file    Marital Status: Not on file   Intimate Partner Violence:     Fear of Current or Ex-Partner: Not on file    Emotionally Abused: Not on file    Physically Abused: Not on file    Sexually Abused: Not on file   Housing Stability:     Unable to Pay for Housing in the Last Year: Not on file    Number of [place] in the Last Year: Not on file    Unstable Housing in the Last Year: Not on file     No current facility-administered medications for this encounter.      Current Outpatient Medications   Medication Sig Dispense Refill    polyethylene glycol (GLYCOLAX) 17 GM/SCOOP powder Take 17 g by mouth daily as needed (constipation) 510 g 0    naproxen (NAPROSYN) 500 MG tablet Take 1 tablet by mouth 2 times daily as needed for Pain 20 tablet 0     No Known Allergies    REVIEW OF SYSTEMS  10 systems reviewed, pertinent positives per HPI otherwise noted to be negative. PHYSICAL EXAM  /76   Pulse 88   Temp 96 °F (35.6 °C) (Tympanic)   Resp 18   Ht 5' 5\" (1.651 m)   Wt 200 lb (90.7 kg)   SpO2 100%   BMI 33.28 kg/m²    GENERAL APPEARANCE: Awake and alert. Cooperative. No acute distress. HENT: Normocephalic. Atraumatic  NECK: Supple. EYES: PERRL. EOM's grossly intact. HEART/CHEST: RRR. No murmurs. LUNGS: Respirations unlabored. CTAB. Good air exchange. Speaking comfortably in full sentences. ABDOMEN: No tenderness. Soft. Non-distended. No masses. No organomegaly. No guarding or rebound. MUSCULOSKELETAL: No extremity edema. Compartments soft. No deformity. No tenderness in the extremities. All extremities neurovascularly intact. SKIN: Warm and dry. No acute rashes. NEUROLOGICAL: Alert and oriented. CN's 2-12 intact. No gross facial drooping. Strength 5/5, sensation intact. Gait normal.  PSYCHIATRIC: agitated    LABS  I have reviewed all labs for this visit.    Results for orders placed or performed during the hospital encounter of 05/14/22   CBC with Auto Differential   Result Value Ref Range    WBC 12.0 (H) 4.0 - 11.0 K/uL    RBC 5.00 4.20 - 5.90 M/uL    Hemoglobin 15.1 13.5 - 17.5 g/dL    Hematocrit 44.1 40.5 - 52.5 %    MCV 88.2 80.0 - 100.0 fL    MCH 30.2 26.0 - 34.0 pg    MCHC 34.2 31.0 - 36.0 g/dL    RDW 13.9 12.4 - 15.4 %    Platelets 591 222 - 199 K/uL    MPV 8.1 5.0 - 10.5 fL    Neutrophils % 71.8 %    Lymphocytes % 20.4 %    Monocytes % 6.4 %    Eosinophils % 0.6 %    Basophils % 0.8 %    Neutrophils Absolute 8.6 (H) 1.7 - 7.7 K/uL    Lymphocytes Absolute 2.5 1.0 - 5.1 K/uL    Monocytes Absolute 0.8 0.0 - 1.3 K/uL    Eosinophils Absolute 0.1 0.0 - 0.6 K/uL    Basophils Absolute 0.1 0.0 - 0.2 K/uL   Comprehensive Metabolic Panel w/ Reflex to MG   Result Value Ref Range    Sodium 138 136 - 145 mmol/L    Potassium reflex Magnesium 3.8 3.5 - 5.1 mmol/L    Chloride 103 99 - 110 mmol/L    CO2 22 21 - 32 mmol/L    Anion Gap 13 3 - 16    Glucose 100 (H) 70 - 99 mg/dL    BUN 5 (L) 7 - 20 mg/dL    CREATININE 0.7 (L) 0.9 - 1.3 mg/dL    GFR Non-African American >60 >60    GFR African American >60 >60    Calcium 9.5 8.3 - 10.6 mg/dL    Total Protein 7.4 6.4 - 8.2 g/dL    Albumin 4.5 3.4 - 5.0 g/dL    Albumin/Globulin Ratio 1.6 1.1 - 2.2    Total Bilirubin 0.5 0.0 - 1.0 mg/dL    Alkaline Phosphatase 87 40 - 129 U/L    ALT 15 10 - 40 U/L    AST 12 (L) 15 - 37 U/L   Urinalysis with Microscopic   Result Value Ref Range    Color, UA Yellow Straw/Yellow    Clarity, UA Clear Clear    Glucose, Ur Negative Negative mg/dL    Bilirubin Urine SMALL (A) Negative    Ketones, Urine 40 (A) Negative mg/dL    Specific Gravity, UA >=1.030 1.005 - 1.030    Blood, Urine TRACE-INTACT (A) Negative    pH, UA 6.5 5.0 - 8.0    Protein,  (A) Negative mg/dL    Urobilinogen, Urine 1.0 <2.0 E.U./dL    Nitrite, Urine Negative Negative    Leukocyte Esterase, Urine Negative Negative    Microscopic Examination YES     Urine Type Cleancatch     Mucus, UA Rare (A) None Seen /LPF    WBC, UA 0-2 0 - 5 /HPF    RBC, UA 3-4 0 - 4 /HPF   Drug screen multi urine   Result Value Ref Range    Amphetamine Screen, Urine Neg Negative <1000ng/mL    Barbiturate Screen, Ur Neg Negative <200 ng/mL    Benzodiazepine Screen, Urine Neg Negative <200 ng/mL    Cannabinoid Scrn, Ur POSITIVE (A) Negative <50 ng/mL    Cocaine Metabolite Screen, Urine Neg Negative <300 ng/mL    Opiate Scrn, Ur Neg Negative <300 ng/mL    PCP Screen, Urine Neg Negative <25 ng/mL    Methadone Screen, Urine Neg Negative <300 ng/mL    Propoxyphene Scrn, Ur Neg Negative <300 ng/mL    Oxycodone Urine Neg Negative <100 ng/ml    pH, UA 6.5     Drug Screen Comment: see below RADIOLOGY  CT HEAD WO CONTRAST   Final Result   No acute intracranial abnormality. XR ABDOMEN (KUB) (SINGLE AP VIEW)   Final Result   Nonspecific abdominal bowel gas pattern. Scattered stool throughout the   colon. XR CHEST PORTABLE   Final Result   No acute cardiopulmonary disease. ED COURSE/MDM  Patient seen and evaluated. Old records reviewed. Labs and imaging reviewed and results discussed with patient. Patient is a 55-year-old male, presenting with multiple complaints over the past several months, tingling in his bilateral upper extremities and shortness of breath, as well as constipation for the past few days. Full HPI as detailed above. Upon arrival in the ED, vitals reassuring. Patient is resting comfortably is in no acute distress. Heart regular rate and rhythm. Lungs clear to auscultation bilaterally. Abdomen soft, nondistended, nontender. Abdominal x-ray was performed, showed nonspecific bowel gas pattern and scattered stool throughout the colon, chest x-ray without acute cardiopulmonary disease. Basic labs were obtained, no acute concerning electrolyte abnormalities. Mild leukocytosis of 12. No anemia. Urine drug screen positive for cannabinoids but otherwise negative. Patient will be started on MiraLAX, is requesting referral for new PCP for follow-up and was given referral to do so. He and his family member were upset about the fact that he did not find anything to explain his symptoms today, I did advise them that given the chronic nature of most of his symptoms do feel that they are appropriate for outpatient follow-up and that they should follow-up with her primary care physician. They are comfortable agreement with plan of care were discharged.     During the patient's ED course, the patient was given:  Medications   ondansetron (ZOFRAN-ODT) disintegrating tablet 4 mg (4 mg Oral Given 5/14/22 1024)   hydrOXYzine (ATARAX) tablet 25 mg (25 mg Oral Given 5/14/22 1024)        CLINICAL IMPRESSION  1. Constipation, unspecified constipation type        Blood pressure 133/76, pulse 88, temperature 96 °F (35.6 °C), temperature source Tympanic, resp. rate 18, height 5' 5\" (1.651 m), weight 200 lb (90.7 kg), SpO2 100 %. DISPOSITION  Elana Dev was discharged to home in good condition. Patient was given scripts for the following medications. I counseled patient how to take these medications. New Prescriptions    POLYETHYLENE GLYCOL (GLYCOLAX) 17 GM/SCOOP POWDER    Take 17 g by mouth daily as needed (constipation)       Follow-up with:  [facility]  [phone]  Schedule an appointment as soon as possible for a visit       [facility] [place] [place] [facility]  [address] [facility]  [phone]  Go to   If symptoms worsen      DISCLAIMER: This chart was created using Dragon dictation software. Efforts were made by me to ensure accuracy, however some errors may be present due to limitations of this technology and occasionally words are not transcribed correctly.        Yohana Henry MD  05/14/22 [id number]

## 2022-05-14 NOTE — ED NOTES
Patient upset with staff that his wait time has been \"so long\" patient reports \"I was feeling better like I could eat and everything and now I'm back to shaking, hot/cold and everything\" provider made aware patient requests to see her.       Jodi Ramirez RN  05/14/22 [id number]

## 2022-05-17 ENCOUNTER — NURSE TRIAGE (OUTPATIENT)
Dept: OTHER | Facility: CLINIC | Age: 27
End: 2022-05-17

## 2022-05-17 NOTE — TELEPHONE ENCOUNTER
Pt said that he has restarted the medications from just over a year ago. He stopped taking them between. Restarted the meds Saturday. He said that he use to have dizziness when he was originally on the meds but that it is worse this time.

## 2022-05-18 NOTE — TELEPHONE ENCOUNTER
Please call and clarify the medication and dosage patient has restarted. Clarify when this prescription was filled and advise patient to not take any  medications. Please also clarify any other medications patient is taking.

## 2022-05-24 ENCOUNTER — OFFICE VISIT (OUTPATIENT)
Dept: PRIMARY CARE CLINIC | Age: 27
End: 2022-05-24
Payer: COMMERCIAL

## 2022-05-24 VITALS
TEMPERATURE: 98 F | DIASTOLIC BLOOD PRESSURE: 78 MMHG | HEIGHT: 65 IN | BODY MASS INDEX: 34.49 KG/M2 | HEART RATE: 62 BPM | OXYGEN SATURATION: 99 % | WEIGHT: 207 LBS | SYSTOLIC BLOOD PRESSURE: 120 MMHG

## 2022-05-24 DIAGNOSIS — R25.1 TREMOR: ICD-10-CM

## 2022-05-24 DIAGNOSIS — G56.21 CUBITAL TUNNEL SYNDROME ON RIGHT: ICD-10-CM

## 2022-05-24 DIAGNOSIS — Z00.00 HEALTHCARE MAINTENANCE: ICD-10-CM

## 2022-05-24 DIAGNOSIS — R20.0 NUMBNESS AND TINGLING OF BOTH UPPER EXTREMITIES: ICD-10-CM

## 2022-05-24 DIAGNOSIS — G56.01 RIGHT CARPAL TUNNEL SYNDROME: ICD-10-CM

## 2022-05-24 DIAGNOSIS — Z86.73 HX OF TIA (TRANSIENT ISCHEMIC ATTACK) AND STROKE: ICD-10-CM

## 2022-05-24 DIAGNOSIS — F31.32 BIPOLAR AFFECTIVE DISORDER, CURRENTLY DEPRESSED, MODERATE (HCC): Primary | ICD-10-CM

## 2022-05-24 DIAGNOSIS — G54.0: ICD-10-CM

## 2022-05-24 DIAGNOSIS — E66.9 OBESITY (BMI 30.0-34.9): ICD-10-CM

## 2022-05-24 DIAGNOSIS — F19.90 SUBSTANCE USE: ICD-10-CM

## 2022-05-24 DIAGNOSIS — R42 DIZZINESS: ICD-10-CM

## 2022-05-24 DIAGNOSIS — F17.210 CIGARETTE NICOTINE DEPENDENCE WITHOUT COMPLICATION: ICD-10-CM

## 2022-05-24 DIAGNOSIS — R20.2 NUMBNESS AND TINGLING OF BOTH UPPER EXTREMITIES: ICD-10-CM

## 2022-05-24 DIAGNOSIS — Z72.0 TOBACCO USER: ICD-10-CM

## 2022-05-24 DIAGNOSIS — K59.00 CONSTIPATION, UNSPECIFIED CONSTIPATION TYPE: ICD-10-CM

## 2022-05-24 DIAGNOSIS — E03.9 HYPOTHYROIDISM, UNSPECIFIED TYPE: ICD-10-CM

## 2022-05-24 DIAGNOSIS — F41.9 ANXIETY: ICD-10-CM

## 2022-05-24 PROCEDURE — 99204 OFFICE O/P NEW MOD 45 MIN: CPT

## 2022-05-24 PROCEDURE — 4004F PT TOBACCO SCREEN RCVD TLK: CPT

## 2022-05-24 PROCEDURE — G8427 DOCREV CUR MEDS BY ELIG CLIN: HCPCS

## 2022-05-24 PROCEDURE — G8417 CALC BMI ABV UP PARAM F/U: HCPCS

## 2022-05-24 RX ORDER — SERTRALINE HYDROCHLORIDE 100 MG/1
100 TABLET, FILM COATED ORAL DAILY
Qty: 90 TABLET | Refills: 0 | Status: SHIPPED | OUTPATIENT
Start: 2022-05-24

## 2022-05-24 RX ORDER — HYDROXYZINE HYDROCHLORIDE 25 MG/1
25 TABLET, FILM COATED ORAL 3 TIMES DAILY
Qty: 120 TABLET | Refills: 0 | Status: SHIPPED | OUTPATIENT
Start: 2022-05-24

## 2022-05-24 ASSESSMENT — ENCOUNTER SYMPTOMS
NAUSEA: 0
CHEST TIGHTNESS: 1
DIARRHEA: 0
CONSTIPATION: 1
EYE DISCHARGE: 0
PHOTOPHOBIA: 0
WHEEZING: 0
ABDOMINAL DISTENTION: 1
VOMITING: 0
BLOOD IN STOOL: 0
ABDOMINAL PAIN: 0
SHORTNESS OF BREATH: 1

## 2022-05-24 ASSESSMENT — PATIENT HEALTH QUESTIONNAIRE - PHQ9
2. FEELING DOWN, DEPRESSED OR HOPELESS: 1
1. LITTLE INTEREST OR PLEASURE IN DOING THINGS: 2
9. THOUGHTS THAT YOU WOULD BE BETTER OFF DEAD, OR OF HURTING YOURSELF: 0
SUM OF ALL RESPONSES TO PHQ QUESTIONS 1-9: 11
5. POOR APPETITE OR OVEREATING: 1
4. FEELING TIRED OR HAVING LITTLE ENERGY: 3
7. TROUBLE CONCENTRATING ON THINGS, SUCH AS READING THE NEWSPAPER OR WATCHING TELEVISION: 1
SUM OF ALL RESPONSES TO PHQ QUESTIONS 1-9: 11
10. IF YOU CHECKED OFF ANY PROBLEMS, HOW DIFFICULT HAVE THESE PROBLEMS MADE IT FOR YOU TO DO YOUR WORK, TAKE CARE OF THINGS AT HOME, OR GET ALONG WITH OTHER PEOPLE: 1
6. FEELING BAD ABOUT YOURSELF - OR THAT YOU ARE A FAILURE OR HAVE LET YOURSELF OR YOUR FAMILY DOWN: 2
SUM OF ALL RESPONSES TO PHQ9 QUESTIONS 1 & 2: 3
SUM OF ALL RESPONSES TO PHQ QUESTIONS 1-9: 11
8. MOVING OR SPEAKING SO SLOWLY THAT OTHER PEOPLE COULD HAVE NOTICED. OR THE OPPOSITE, BEING SO FIGETY OR RESTLESS THAT YOU HAVE BEEN MOVING AROUND A LOT MORE THAN USUAL: 0
SUM OF ALL RESPONSES TO PHQ QUESTIONS 1-9: 11
3. TROUBLE FALLING OR STAYING ASLEEP: 1

## 2022-05-24 ASSESSMENT — ANXIETY QUESTIONNAIRES
7. FEELING AFRAID AS IF SOMETHING AWFUL MIGHT HAPPEN: 2
2. NOT BEING ABLE TO STOP OR CONTROL WORRYING: 2
3. WORRYING TOO MUCH ABOUT DIFFERENT THINGS: 1
1. FEELING NERVOUS, ANXIOUS, OR ON EDGE: 3
GAD7 TOTAL SCORE: 11
6. BECOMING EASILY ANNOYED OR IRRITABLE: 1
5. BEING SO RESTLESS THAT IT IS HARD TO SIT STILL: 1
4. TROUBLE RELAXING: 1
IF YOU CHECKED OFF ANY PROBLEMS ON THIS QUESTIONNAIRE, HOW DIFFICULT HAVE THESE PROBLEMS MADE IT FOR YOU TO DO YOUR WORK, TAKE CARE OF THINGS AT HOME, OR GET ALONG WITH OTHER PEOPLE: SOMEWHAT DIFFICULT

## 2022-05-24 NOTE — PATIENT INSTRUCTIONS
Please do not continue the clonidine 0.2 mg for high blood pressure, has been normal.    May continue other medications as prescribed, will order and sent to [facility]. On sertraline, be care of other NSAIDs (ibuprofen, naproxen, etc) for risk of stomach ulcers. Please consider getting lab tests for your cholesterol, diabetes screen, and history of levothyroxine use for thyroid disease.

## 2022-05-24 NOTE — PROGRESS NOTES
[address] and [facility]                                             [address] [facility], [facility], [address] [place], [place] [id number]        Phone: [phone]  ATTENDING ATTESTATION:   Patient was seen and evaluated with the resident physician and medical student. I agree with plan of care as documented above. Ike Rankin MD, CAQSM      Addendum required for completion of attestation    Patient was seen and evaluated with the medical student  I agree with plan of care as documented below. Name:  Ginette Fabian  :    1995    Consultants:   Patient Care Team:  Sharon Strauss MD as PCP - General (Family Medicine)    Chief Complaint:     Ginette Fabian is a 23 y.o. 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 HPI:     Has had intermittent dizziness, weakness, decreased energy, hot and cold flashes, numbness and tingling, for the last two months. Numbness/tingling would start in L hand, then right, spreads up his face and all through body. Thoughts are racing. Episodes would last hours, come in waves. Has progressively gotten worse. - Upon chart review patient does have history of taking levothyroxine 50 mcg for presumed thyroid disease in 2018 (found in office note from 2020 with neurology). Last week, started taking his old clonidine 0.2 mg twice daily for BP, sertraline 100 mg once daily for depression, hydroxyzine 25 mg 3 times daily (1 in AM, 2 nightly) been feeling lots better since. Still low energy, occasionally dizziness right after taking clonidine. Had stopped taking all these medications 1 year ago after his mother . Bipolar Disorder Type 1  - Diagnosed a few years ago. Has taken many medications for it in the past, last noted lithium and olanzipine.  Reports his mom said those medications worked, but he stopped about a year ago. Denies any manic symptoms. Has had lots of decreased energy, depressed mood, anhedonia, hypersomnia. Denies difficulty concentrating, suicidal thoughts. - Sertraline 100 mg daily has been helpful. Pt reports taking fluoxetine in the past and failed therapy. - 11 DANIEL and 11 PHQ  - History of suicide attempt , jump in front of a car    ADD/ADHD:    - Not currently taking any medication for this. Unsure what he's taken in the past. Says he doesn't have any residual symptoms, just had issues listening to teachers at school. Old ischemic infarct  - Believed to have a stroke due to ischemic injury at birth. Prior MRI  showed encephalomalacia in L frontal lobe   - Saw neurology and neurosurgery for it once. 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 - Left hand dominant.  - R hand has minimal use, is very weak, cannot make full . Is working on obtaining disability. Cubital tunnel syndrome, right carpal tunnel syndrome  - Followed with  in   - Had an EMG that confirmed diagnosis. - Never got treatment for it due to lack of insurance. - Says symptoms are improved    Surgical history: needle removal, age 13    Social:   Mother  3 year ago, father recently incarcerated. Him and his brother struggle a lot financially. They are moving to [place] in 3 weeks. Diet: 1 meal a day because of $. Will 'splurge' on weekends. Does not eat healthy  Exercise: not much  Tobacco: current every day smoker, 0.5 PPD since age 16. Vaping: none  Alcohol: socially  Recreational drug: history of use of marijuana, methamphetamine. Denies any recent use.   Family history: mom with HTN, DM  Wants to go onto disability because he cannot lift right arm, poor social skills.     Health Maintenance  - Chicken pox: says he's vaccinated  - Covid vaccine: vaccinated  - Pneumococcal Vaccine, not vaccinated  - HPV vaccine:  says he's vaccinated  - HIV and Hep C Screen due      Patient Active Problem List   Diagnosis    Brachial plexopathy, congenital    Right arm pain    Hx of TIA (transient ischemic attack) and stroke in 2009         Past Medical History:    Past Medical History:   Diagnosis Date    ADHD (attention deficit hyperactivity disorder)     Allergy     Brain lesion     Depression     Foreign body (FB) in soft tissue     MRSA (methicillin resistant staph aureus) culture positive     Outbursts of anger     Overweight        Past Surgical History:  Past Surgical History:   Procedure Laterality Date    OTHER SURGICAL HISTORY Left 4/15/15    exploration of buttock    OTHER SURGICAL HISTORY Left 10/06/2016    exploration buttock       Home Meds:  Prior to Visit Medications    Medication Sig Taking? Authorizing Provider   sertraline (ZOLOFT) 100 MG tablet Take 1 tablet by mouth daily Yes Zaki Berrios MD   hydrOXYzine (ATARAX) 25 MG tablet Take 1 tablet by mouth 3 times daily Yes Zaki Berrios MD   polyethylene glycol (GLYCOLAX) 17 GM/SCOOP powder Take 17 g by mouth daily as needed (constipation) Yes Russ Johnson MD       Allergies:    Patient has no known allergies.     Family History:       Problem Relation Age of Onset    High Blood Pressure Mother     Diabetes Mother    Babb Other Father          Health Maintenance Completed:  Health Maintenance   Topic Date Due    Varicella vaccine (1 of 2 - 2-dose childhood series) Never done    COVID-19 Vaccine (1) Never done    Pneumococcal 0-64 years Vaccine (1 - PCV) Never done    HPV vaccine (1 - Male 2-dose series) Never done    Depression Monitoring  Never done    HIV screen  Never done    Hepatitis C screen  Never done    Flu vaccine (Season Ended) 09/01/2022    DTaP/Tdap/Td vaccine (3 - Td or Tdap) 07/03/2025    Hepatitis A vaccine  Aged Out    Hepatitis B vaccine  Aged Out    Hib vaccine  Aged Out  Meningococcal (ACWY) vaccine  Aged [employer] History   Administered Date(s) Administered    Tdap (Boostrix, Adacel) 04/14/2015         Review of Systems:  Review of Systems   Constitutional: Positive for diaphoresis and fatigue. Negative for activity change, chills, fever and unexpected weight change. HENT: Negative. Eyes: Negative for photophobia, discharge and visual disturbance. Respiratory: Positive for chest tightness and shortness of breath. Negative for wheezing. Cardiovascular: Positive for chest pain and palpitations. Negative for leg swelling. Chest pain once last week. Palpitations with panic attacks   Gastrointestinal: Positive for abdominal distention and constipation. Negative for abdominal pain, blood in stool, diarrhea, nausea and vomiting. Endocrine: Positive for cold intolerance and heat intolerance. Negative for polydipsia, polyphagia and polyuria. Genitourinary: Negative for difficulty urinating, flank pain and frequency. Musculoskeletal: Negative. Skin: Negative. Neurological: Positive for tremors, weakness, light-headedness and numbness. Negative for dizziness, seizures, syncope, facial asymmetry, speech difficulty and headaches. Psychiatric/Behavioral: Positive for dysphoric mood and sleep disturbance. Negative for confusion, decreased concentration, hallucinations, self-injury and suicidal ideas. The patient is nervous/anxious. The patient is not hyperactive. Physical Exam:   Vitals:    05/24/22 1005   BP: 120/78   Site: Left Upper Arm   Position: Sitting   Cuff Size: Small Adult   Pulse: 62   Temp: 98 °F (36.7 °C)   TempSrc: Oral   SpO2: 99%   Weight: 207 lb (93.9 kg)   Height: 5' 4.96\" (1.65 m)     Body mass index is 34.49 kg/m².     Wt Readings from Last 3 Encounters:   05/24/22 207 lb (93.9 kg)   05/14/22 200 lb (90.7 kg)   10/30/21 240 lb (108.9 kg)       BP Readings from Last 3 Encounters:   05/24/22 120/78   05/14/22 128/78 energy 3   Poor appetite or overeating 1   Feeling bad about yourself - or that you are a failure or have let yourself or your family down 2   Trouble concentrating on things, such as reading the newspaper or watching television 1   Moving or speaking so slowly that other people could have noticed. Or the opposite - being so fidgety or restless that you have been moving around a lot more than usual 0   Thoughts that you would be better off dead, or of hurting yourself in some way 0   PHQ-2 Score 3   PHQ-9 Total Score 11   If you checked off any problems, how difficult have these problems made it for you to do your work, take care of things at home, or get along with other people? 1       DANILE 7 SCORE 5/24/2022   DANIEL-7 Total Score 11     Interpretation of DANIEL-7 score: 5-9 = mild anxiety, 10-14 = moderate anxiety, 15+ = severe anxiety. Recommend referral to behavioral health for scores 10 or greater.           Lab Review:   Admission on 05/14/2022, Discharged on 05/14/2022   Component Date Value    WBC 05/14/2022 12.0*    RBC 05/14/2022 5.00     Hemoglobin 05/14/2022 15.1     Hematocrit 05/14/2022 44.1     MCV 05/14/2022 88.2     MCH 05/14/2022 30.2     MCHC 05/14/2022 34.2     RDW 05/14/2022 13.9     Platelets 74/24/3372 285     MPV 05/14/2022 8.1     Neutrophils % 05/14/2022 71.8     Lymphocytes % 05/14/2022 20.4     Monocytes % 05/14/2022 6.4     Eosinophils % 05/14/2022 0.6     Basophils % 05/14/2022 0.8     Neutrophils Absolute 05/14/2022 8.6*    Lymphocytes Absolute 05/14/2022 2.5     Monocytes Absolute 05/14/2022 0.8     Eosinophils Absolute 05/14/2022 0.1     Basophils Absolute 05/14/2022 0.1     Sodium 05/14/2022 138     Potassium reflex Magnesi* 05/14/2022 3.8     Chloride 05/14/2022 103     CO2 05/14/2022 22     Anion Gap 05/14/2022 13     Glucose 05/14/2022 100*    BUN 05/14/2022 5*    CREATININE 05/14/2022 0.7*    GFR Non- 05/14/2022 >60     GFR  05/14/2022 >60     Calcium 05/14/2022 9.5     Total Protein 05/14/2022 7.4     Albumin 05/14/2022 4.5     Albumin/Globulin Ratio 05/14/2022 1.6     Total Bilirubin 05/14/2022 0.5     Alkaline Phosphatase 05/14/2022 87     ALT 05/14/2022 15     AST 05/14/2022 12*    Color, UA 05/14/2022 Yellow     Clarity, UA 05/14/2022 Clear     Glucose, Ur 05/14/2022 Negative     Bilirubin Urine 05/14/2022 SMALL*    Ketones, Urine 05/14/2022 40*    Specific Gravity, UA 05/14/2022 >=1.030     Blood, Urine 05/14/2022 TRACE-INTACT*    pH, UA 05/14/2022 6.5     Protein, UA 05/14/2022 100*    Urobilinogen, Urine 05/14/2022 1.0     Nitrite, Urine 05/14/2022 Negative     Leukocyte Esterase, Urine 05/14/2022 Negative     Microscopic Examination 05/14/2022 YES     Urine Type 05/14/2022 Cleancatch     Mucus, UA 05/14/2022 Rare*    WBC, UA 05/14/2022 0-2     RBC, UA 05/14/2022 3-4     Amphetamine Screen, Urine 05/14/2022 Neg     Barbiturate Screen, Ur 05/14/2022 Neg     Benzodiazepine Screen, U* 05/14/2022 Neg     Cannabinoid Scrn, Ur 05/14/2022 POSITIVE*    Cocaine Metabolite Scree* 05/14/2022 Neg     Opiate Scrn, Ur 05/14/2022 Neg     PCP Screen, Urine 05/14/2022 Neg     Methadone Screen, Urine 05/14/2022 Neg     Propoxyphene Scrn, Ur 05/14/2022 Neg     Oxycodone Urine 05/14/2022 Neg     pH, UA 05/14/2022 6.5     Drug Screen Comment: 05/14/2022 see below           Assessment/Plan:  Jese Winter is a 24 yo male with a PMH of ADHD, prior stroke, mood disorder, carpal tunnel syndrome who is presenting to [facility] care and for new onset symptoms of tremors, numbness, depression, and anxiety. 1. Bipolar affective disorder, currently depressed, moderate ([address]  - Denies current symptoms of annamaria.  Has severe symptoms of depression including anhedonia, depressed mood, sleep issues, decreased energy.  - Pt with history of ADHD, but denies current symptoms of decreased concentration  - Currently not taking any medications for this. Was on olanzapine and lithium in the past, which helped him with symptoms per their recollection from mom. - Lithium not a great option at this time due to need for surveillance and pt is moving to [place] in 3 weeks  - Recommended starting patient on olanzipine + fluoxetine given patients history of doing well with these medications. - However, patient did not want to start this regimen because does not want bipolar treatment, does not believe he has it and it was something that was reportedly blown out of proportion by his mother. Also reported not tolerating fluoxetine in past. He understands the risks and benefits of doing so. - Continue sertraline 100 mg daily. Will send to hospital outpatient pharmacy due to pt's concern of cost  - sertraline (ZOLOFT) 100 MG tablet; Take 1 tablet by mouth daily  Dispense: 90 tablet; Refill: 0    2. Anxiety  3. Tremor  4. Numbness and tingling of both upper extremities  - Differential diagnosis includes panic attacks. hyperthyroidism, hypothyroidism, substance induced, withdrawal induced, carcinoid tumor, infectious. Most consistent with panic attacks given high anxiety, feeling of impending doom, and symptom improvement with hydroxyzine administration.   - Continue taking hydroxyzine 25 mg prn TID, sent to hospital outpatient pharmacy  - Continue taking sertraline 100 mg daily as above  - TSH ordered to rule-out other causes  - sertraline (ZOLOFT) 100 MG tablet; Take 1 tablet by mouth daily  Dispense: 90 tablet; Refill: 0  - hydrOXYzine (ATARAX) 25 MG tablet; Take 1 tablet by mouth 3 times daily  Dispense: 120 tablet; Refill: 0   - TSH with Reflex; Future    5. Dizziness  - Onset related directly to use of 0.2 mg clonidine BID  - Discontinue use of clonidine and has short half life, likely not contributing beneficence to patient  - Pt normotensive 120/78    6.  Constipation, unspecified constipation type  - Well-controlled  - Has resolved with use of Miralax  - Counseled patient on healthy lifestyle changes of increased fiber, pt unable to follow due to social situation    7. Hypothyroidism, unspecified type  - Chart review, use of levothyroxine 50 mcg daily  - ROS concerning for anxiety vs. Hypothyroidism  - Pt reports financial constraints, have encouraged patient to get labs done to evaluate for hypothyroidism relating to symptoms   - TSH with Reflex; Future    8. Hx of TIA (transient ischemic attack) and stroke  - MRI shows L frontal lobe encephalomalacia consistent with prior ischemic injury. Thought to be due to birth injury. Saw neurology, instructed to follow up only as needed    9. Brachial plexopathy, congenital  - Originally followed with [facility] [address], never had surgery. - Left hand dominant  - Working on getting disability, moving to [place] soon    10. Cubital tunnel syndrome on right  11. Right carpal tunnel syndrome  - Followed with  in 2019/2020? [facility] EMG performed in past, confirmed diagnosis  - Offered wrist splint. Patient cannot afford. Reports his symptoms are overall much better now    12. Cigarette nicotine dependence without complication  13. Tobacco user  - Current every day smoker, 0.5 PPD   - Currently precontemplative  - Offered assistance to quit smoking, including nicotine patch. Pt declined due to cost    14. Substance use  - History of use of marijuana and methamphetamine. Last UDS positive for cannabinoid 5/14/2022. - Pt states not currently using as patient reports he was nearly denied care due to his marijuana use. Congratulated patient on this    13. Obesity (BMI 30.0-34.9)  16. Healthcare maintenance  - Due for lipid and diabetes screen, ordered.  Encouraged to get labs to evaluate due to concerns of BMI  - HIV, and Hep C Screen - not ordered today due to financial concerns  Vaccine record not available, reportedly:  - Chicken pox - patient reports he's vaccinated  - Covid vaccine: fully vaccinated  - HPV vaccine - patient reports he's vaccinated  - Pneumococcal Vaccine (out of [facility]): has not had. Indicated due to smoking. Will offer at next visit. - Hemoglobin A1C; Future  - Lipid Panel; Future        Health Maintenance Due:  Health Maintenance Due   Topic Date Due    Varicella vaccine (1 of 2 - 2-dose childhood series) Never done    COVID-19 Vaccine (1) Never done    Pneumococcal 0-64 years Vaccine (1 - PCV) Never done    HPV vaccine (1 - Male 2-dose series) Never done    Depression Monitoring  Never done    HIV screen  Never done    Hepatitis C screen  Never done        Health care decision maker:  <72years old      Health Maintenance: (USPSTF Recommendations)  (F) Breast Cancer Screen: (40-49 (C), 50-74 biennial screening mammogram (B))  (F) Cervical Cancer Screen: (21-29 q3yr cytology alone; 30-65 q3yr cytology alone, q5yr with hrHPV alone, or q5yr cytology+hrHPV (A))  CRC/Colonoscopy Screening: (adults 45-49 (B), 50-75 (A))  Lung Ca Screening: Annual LDCT (+smoker age 49-80, smoked within 15 years, total of 20 pack yr history (B)):  DEXA Screen: (women >65 and older, <65 if at risk/postmenopausal (B))  HIV Screen: (16-65 yr old, and all pregnant patients (A)): Hep C Screen: (18-79 yr old (B)):  HCC Screen: (all pts with cirrhosis and high risk Hep B (US q6 mo)):    RTC:  Return in about 1 month (around 6/24/2022) for follow-up. [address] 54 student     I reviewed with the medical student the medical history and the medical student's findings on the physical examination. I discussed with the medical student and the patient's diagnosis and agree with the plan and have made clarifications.     Hal López MD  PGY-1  5/24/2022

## 2022-05-24 NOTE — PROGRESS NOTES
I reviewed with the medical student the medical history and the medical student's findings on the physical examination. I discussed with the medical student and the patient's diagnosis and agree with the plan, have made clarifications regarding medications. MD Chano Gomez [address] and [facility]                                             [address] [facility], [address] [place] [id number]        Phone: [phone]      Name:  Jese Winter  :    1995    Consultants:   Patient Care Team:  Vignesh Soto MD as PCP - General (Family Medicine)    Chief Complaint:     Jese Winter is a 23 y.o. 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 HPI:     Has had intermittent dizziness, weakness, decreased energy, hot and cold flashes, numbness and tingling, for the last two months. Numbness/tingling would start in L hand, then right, spreads up his face and all through body. Thoughts are racing. Episodes would last hours, come in waves. Has progressively gotten worse. Last week, started taking his old clonidine 0.2mg twice daily, sertraline 100mg once daily, hydroxyzine 25 mg 3 times daily (1 am, 2 in pm), been feeling lots better since. Still low energy, occasionally dizziness right after taking clonidine. Clonidine was for BP. Hydroxyzine for anxiety, sertraline for depression. Had stopped taking all these medications 1 year ago after his mother . ADD/ADHD:    Not currently taking any medication for this. Unsure what he's taken in the past. Says he doesn't have any residual symptoms, just had issues listening to teachers at school. Bipolar Disorder Type 1  Diagnosed a few years ago. Has taken many medications for it in the past, last noted lithium and olanzipine.  Reports his mom said those medications worked, but he stopped about a year ago. Denies any manic symptoms. Has had lots of decreased energy, depressed mood, anhedonia, hypersomnia. Denies difficulty concentrating, suicidal thoughts. 11 DANIEL and PHQ  History of suicide attempt , jump in front of a car    Old ischemic infarct  - Believed to have a stroke due to ischemic injury at birth. Prior MRI showed encephalomalacia in L frontal lobe   - Saw neurology and neurosurgery for it once. 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 - Left hand dominant.  - R hand has minimal use, is very weak, cannot make full     Cubital tunnel syndrome, right carpal tunnel syndrome  - Followed with  in   - Had an EMG that confirmed diagnosis. - Never got treatment for it due to lack of insurance. - Says symptoms are improved    Surgical history: needle removal, age 13    Social: Mother  3 year ago, father recently incarcerated. Him and his brother struggle a lot financially. They are moving to [place] in 3 weeks. Diet: 1 meal a day because of $. Will 'splurge' on weekends. Does not eat healthy  Exercise: not much  Tobacco: current every day smoker, 0.5 PPD since age 16. Vaping: none  Alcohol: socially  Recreational drug: history of use of marijuana, methamphetamine. Denies any recent use. Family history: mom with HTN, DM  Wants to go onto disability because he cannot lift right arm, poor social skills.      Health Maintenance  - Chicken pox: says he's vaccinated  - Covid vaccine: vaccinated  - Pneumococcal Vaccine (out of [id number])  - HPV vaccine:  says he's vaccinated  - HIV, and Hep C Screen  - Depression screen      Patient Active Problem List   Diagnosis    Brachial plexopathy, congenital    Right arm pain    Hx of TIA (transient ischemic attack) and stroke in          Past Medical History:    Past Medical History: Diagnosis Date    ADHD (attention deficit hyperactivity disorder)     Allergy     Brain lesion     Depression     Foreign body (FB) in soft tissue     MRSA (methicillin resistant staph aureus) culture positive     Outbursts of anger     Overweight        Past Surgical History:  Past Surgical History:   Procedure Laterality Date    OTHER SURGICAL HISTORY Left 4/15/15    exploration of buttock    OTHER SURGICAL HISTORY Left 10/06/2016    exploration buttock       Home Meds:  Prior to Visit Medications    Medication Sig Taking? Authorizing Provider   sertraline (ZOLOFT) 100 MG tablet Take 1 tablet by mouth daily Yes Janine Rothman MD   hydrOXYzine (ATARAX) 25 MG tablet Take 1 tablet by mouth 3 times daily Yes Janine Rothman MD   polyethylene glycol (GLYCOLAX) 17 GM/SCOOP powder Take 17 g by mouth daily as needed (constipation) Yes Corby Lebron MD       Allergies:    Patient has no known allergies. Family History:       Problem Relation Age of Onset    High Blood Pressure Mother     Diabetes Mother     Other Father          Health Maintenance Completed:  Health Maintenance   Topic Date Due    Varicella vaccine (1 of 2 - 2-dose childhood series) Never done    COVID-19 Vaccine (1) Never done    Pneumococcal 0-64 years Vaccine (1 - PCV) Never done    HPV vaccine (1 - Male 2-dose series) Never done    Depression Monitoring  Never done    HIV screen  Never done    Hepatitis C screen  Never done    Flu vaccine (Season Ended) 09/01/2022    DTaP/Tdap/Td vaccine (3 - Td or Tdap) 07/03/2025    Hepatitis A vaccine  Aged Out    Hepatitis B vaccine  Aged Out    Hib vaccine  Aged Out    Meningococcal (ACWY) vaccine  Aged [employer] History   Administered Date(s) Administered    Tdap (Boostrix, Adacel) 04/14/2015         Review of Systems:  Review of Systems   Constitutional: Positive for diaphoresis and fatigue. Negative for activity change, chills, fever and unexpected weight change. HENT: Negative. Eyes: Negative for photophobia, discharge and visual disturbance. Respiratory: Positive for chest tightness and shortness of breath. Negative for wheezing. Cardiovascular: Positive for chest pain and palpitations. Negative for leg swelling. Chest pain once last week. Palpitations with panic attacks   Gastrointestinal: Positive for abdominal distention and constipation. Negative for abdominal pain, blood in stool, diarrhea, nausea and vomiting. Endocrine: Positive for cold intolerance and heat intolerance. Negative for polydipsia, polyphagia and polyuria. Genitourinary: Negative for difficulty urinating, flank pain and frequency. Musculoskeletal: Negative. Skin: Negative. Neurological: Positive for tremors, weakness, light-headedness and numbness. Negative for dizziness, seizures, syncope, facial asymmetry, speech difficulty and headaches. Psychiatric/Behavioral: Positive for dysphoric mood and sleep disturbance. Negative for confusion, decreased concentration, hallucinations, self-injury and suicidal ideas. The patient is nervous/anxious. The patient is not hyperactive. Physical Exam:   Vitals:    05/24/22 1005   BP: 120/78   Site: Left Upper Arm   Position: Sitting   Cuff Size: Small Adult   Pulse: 62   Temp: 98 °F (36.7 °C)   TempSrc: Oral   SpO2: 99%   Weight: 207 lb (93.9 kg)   Height: 5' 4.96\" (1.65 m)     Body mass index is 34.49 kg/m². Wt Readings from Last 3 Encounters:   05/24/22 207 lb (93.9 kg)   05/14/22 200 lb (90.7 kg)   10/30/21 240 lb (108.9 kg)       BP Readings from Last 3 Encounters:   05/24/22 120/78   05/14/22 128/78   10/30/21 (!) 146/86       Physical Exam  Constitutional:       General: He is not in acute distress. Appearance: Normal appearance. He is obese. HENT:      Head: Normocephalic and atraumatic.       Right Ear: Tympanic membrane, ear canal and external ear normal.      Left Ear: Tympanic membrane, ear canal and external ear normal. Nose: Nose normal. No congestion or rhinorrhea. Mouth/Throat:      Mouth: Mucous membranes are moist.      Pharynx: Oropharynx is clear. No oropharyngeal exudate or posterior oropharyngeal erythema. Eyes:      Extraocular Movements: Extraocular movements intact. Conjunctiva/sclera: Conjunctivae normal.      Pupils: Pupils are equal, round, and reactive to light. Cardiovascular:      Rate and Rhythm: Normal rate and regular rhythm. Heart sounds: No murmur heard. No friction rub. No gallop. Pulmonary:      Effort: Pulmonary effort is normal.      Breath sounds: Normal breath sounds. Abdominal:      General: Abdomen is flat. Bowel sounds are normal. There is no distension. Palpations: Abdomen is soft. Tenderness: There is no abdominal tenderness. There is no guarding. Musculoskeletal:         General: No swelling or tenderness. Cervical back: Normal range of motion and neck supple. Right lower leg: No edema. Left lower leg: No edema. Skin:     General: Skin is warm. Findings: No bruising or rash. Neurological:      Mental Status: He is alert and oriented to person, place, and time. Motor: Weakness present. Comments: Right  strength and ROM significantly decreased.  Decreased sensation in R distal extremity   Psychiatric:      Comments: + depressed mood              Lab Review:   Admission on 05/14/2022, Discharged on 05/14/2022   Component Date Value    WBC 05/14/2022 12.0*    RBC 05/14/2022 5.00     Hemoglobin 05/14/2022 15.1     Hematocrit 05/14/2022 44.1     MCV 05/14/2022 88.2     MCH 05/14/2022 30.2     MCHC 05/14/2022 34.2     RDW 05/14/2022 13.9     Platelets 99/78/4122 285     MPV 05/14/2022 8.1     Neutrophils % 05/14/2022 71.8     Lymphocytes % 05/14/2022 20.4     Monocytes % 05/14/2022 6.4     Eosinophils % 05/14/2022 0.6     Basophils % 05/14/2022 0.8     Neutrophils Absolute 05/14/2022 8.6*    Lymphocytes Absolute 05/14/2022 2.5 Monocytes Absolute 05/14/2022 0.8     Eosinophils Absolute 05/14/2022 0.1     Basophils Absolute 05/14/2022 0.1     Sodium 05/14/2022 138     Potassium reflex Magnesi* 05/14/2022 3.8     Chloride 05/14/2022 103     CO2 05/14/2022 22     Anion Gap 05/14/2022 13     Glucose 05/14/2022 100*    BUN 05/14/2022 5*    CREATININE 05/14/2022 0.7*    GFR Non- 05/14/2022 >60     GFR  05/14/2022 >60     Calcium 05/14/2022 9.5     Total Protein 05/14/2022 7.4     Albumin 05/14/2022 4.5     Albumin/Globulin Ratio 05/14/2022 1.6     Total Bilirubin 05/14/2022 0.5     Alkaline Phosphatase 05/14/2022 87     ALT 05/14/2022 15     AST 05/14/2022 12*    Color, UA 05/14/2022 Yellow     Clarity, UA 05/14/2022 Clear     Glucose, Ur 05/14/2022 Negative     Bilirubin Urine 05/14/2022 SMALL*    Ketones, Urine 05/14/2022 40*    Specific Gravity, UA 05/14/2022 >=1.030     Blood, Urine 05/14/2022 TRACE-INTACT*    pH, UA 05/14/2022 6.5     Protein, UA 05/14/2022 100*    Urobilinogen, Urine 05/14/2022 1.0     Nitrite, Urine 05/14/2022 Negative     Leukocyte Esterase, Urine 05/14/2022 Negative     Microscopic Examination 05/14/2022 YES     Urine Type 05/14/2022 Cleancatch     Mucus, UA 05/14/2022 Rare*    WBC, UA 05/14/2022 0-2     RBC, UA 05/14/2022 3-4     Amphetamine Screen, Urine 05/14/2022 Neg     Barbiturate Screen, Ur 05/14/2022 Neg     Benzodiazepine Screen, U* 05/14/2022 Neg     Cannabinoid Scrn, Ur 05/14/2022 POSITIVE*    Cocaine Metabolite Scree* 05/14/2022 Neg     Opiate Scrn, Ur 05/14/2022 Neg     PCP Screen, Urine 05/14/2022 Neg     Methadone Screen, Urine 05/14/2022 Neg     Propoxyphene Scrn, Ur 05/14/2022 Neg     Oxycodone Urine 05/14/2022 Neg     pH, UA 05/14/2022 6.5     Drug Screen Comment: 05/14/2022 see below           Assessment/Plan:  Vinicius Marino is a 24 yo male with a PMH of ADHD, prior stroke, Bipolar disorder, carpal tunnel syndrome who is presenting to [facility] care and for new onset symptoms of tremors, numbness, anxiety, and     1. Tremors, hot and cold flashes, numbess  - Differential diagnosis includes panic attacks. hyperthyroidism, hypothyroidism, substance induced, withdrawal induced, carcinoid tumor, infectious. Most consistent with panic attacks given high anxiety, feeling of impending doom, and symptom improvement with hydroxyzine administration.   - Continue taking hydroxyzine 25 mg PRN. Refill given today  - Continue taking sertraline 100mg, refill given today  - Stop taking clonidine 0.2mg   - TSH with reflex    2. Bipolar Affective Disorder, Depressive symptom predominant  - Denies current symptoms of annamaria. Has severe symptoms of depression including anhedonia, depressed mood, sleep issues, decreased energy.  - Currently not taking any medications for this. Was on olanzapine and lithium in the past, which helped him with symptoms per mom. Lithium not a great option because of issues following up, moving to [place] in 3 weeks  - Recommended starting patient on olanzipine + fluoxetine given patients history of doing well with these medications. However, patient did not want to start this regimen because does not want BP treatment, does not believe he has it. Reported not tolerating fluoxetine in past.  - Continue sertraline 100 mg    3. Constipation  - Resolved with use of miralax    4. ADD/ADHD:    - Currently well controlled, taking no medication    5. History of ischemic injury to brain at birth  - MRI shows L frontal lobe encephalomalacia consistent with prior ischemic injury. Thought to be due to birth injury. Saw neurology, instructed to follow up only as needed     6. 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 - Left hand dominant  - Requesting help getting disability    7. Cubital tunnel syndrome, right carpal tunnel syndrome  - Followed with  in 2019/2020? Kashif Collier  EMG performed in past, confirmed diagnosis  - Offered wrist splint. Patient cannot afford. Reports his symptoms are overall much better now    8. Obesity  - Lipid panel today  - HbA1c today  - Counseled on increasing vegetables and fruit in diet, restricting portion sizes, decreasing sweets and sodas and simple carbohydrates. Patient currently experiencing barriers to healthy eating, largely due to cost  - Counseled on increasing exercise to 150 minutes of vigorous aerobic activity weekly. 9. Nicotine dependence, Current smoker  - Current every day smoker, 0.5 PPD   - Currently precontemplative  - Offered assistance to quit smoking, including nicotine patch    10. Polysubstance use, in remission  - history of use of marijuana and methamphetamine. Not currently using. Congratulated patient on this     6. Health Maintenance  - Chicken pox - patient reports he's vaccinated  - Covid vaccine: fully vaccinated  - Pneumococcal Vaccine (out of [id number]): indicated due to smoking. Will offer at next visit.    - HPV vaccine - patient reports he's vaccinated  - HIV, and Hep C Screen - not ordered today due to financial concerns  - Depression screen completed today    Health Maintenance Due:  Health Maintenance Due   Topic Date Due    Varicella vaccine (1 of 2 - 2-dose childhood series) Never done    COVID-19 Vaccine (1) Never done    Pneumococcal 0-64 years Vaccine (1 - PCV) Never done    HPV vaccine (1 - Male 2-dose series) Never done    Depression Monitoring  Never done    HIV screen  Never done    Hepatitis C screen  Never done        Health care decision maker:  <72years old      Health Maintenance: (USPSTF Recommendations)  (F) Breast Cancer Screen: (40-49 (C), 50-74 biennial screening mammogram (B))  (F) Cervical Cancer Screen: (21-29 q3yr cytology alone; 30-65 q3yr cytology alone, q5yr with hrHPV alone, or q5yr cytology+hrHPV (A))  (M) Prostate Cancer Screen: (54-79 yo discuss benefits/harm, does not recommend testing PSA in men >73 yo (D):   (M) AAA Screen: (men 73-69 yo who has ever smoked (B), consider in nonsmokers if high risk):  CRC/Colonoscopy Screening: (adults 39-53 (B), 50-75 (A))  Lung Ca Screening: Annual LDCT (+smoker age 49-80, smoked within 15 years, total of 20 pack yr history (B)):  DEXA Screen: (women >65 and older, <65 if at risk/postmenopausal (B))  HIV Screen: (16-65 yr old, and all pregnant patients (A)): Hep C Screen: (18-79 yr old (B)):  HCC Screen: (all pts with cirrhosis and high risk Hep B (US q6 mo)):  Immunizations:    RTC:  Return in about 1 month (around 6/24/2022) for follow-up.        Gisele Levin student